# Patient Record
Sex: FEMALE | NOT HISPANIC OR LATINO | Employment: STUDENT | URBAN - METROPOLITAN AREA
[De-identification: names, ages, dates, MRNs, and addresses within clinical notes are randomized per-mention and may not be internally consistent; named-entity substitution may affect disease eponyms.]

---

## 2023-02-19 ENCOUNTER — HOSPITAL ENCOUNTER (EMERGENCY)
Facility: CLINIC | Age: 23
Discharge: HOME | End: 2023-02-20
Attending: EMERGENCY MEDICINE | Admitting: EMERGENCY MEDICINE
Payer: COMMERCIAL

## 2023-02-19 ENCOUNTER — APPOINTMENT (OUTPATIENT)
Dept: CT IMAGING | Facility: CLINIC | Age: 23
End: 2023-02-19
Attending: EMERGENCY MEDICINE
Payer: COMMERCIAL

## 2023-02-19 ENCOUNTER — OFFICE VISIT (OUTPATIENT)
Dept: URGENT CARE | Facility: URGENT CARE | Age: 23
End: 2023-02-19
Payer: COMMERCIAL

## 2023-02-19 VITALS
HEIGHT: 65 IN | OXYGEN SATURATION: 100 % | TEMPERATURE: 97.8 F | SYSTOLIC BLOOD PRESSURE: 116 MMHG | DIASTOLIC BLOOD PRESSURE: 67 MMHG | BODY MASS INDEX: 20.83 KG/M2 | WEIGHT: 125 LBS | HEART RATE: 86 BPM

## 2023-02-19 DIAGNOSIS — R10.31 RLQ ABDOMINAL PAIN: Primary | ICD-10-CM

## 2023-02-19 DIAGNOSIS — R10.33 PERIUMBILICAL ABDOMINAL PAIN: ICD-10-CM

## 2023-02-19 DIAGNOSIS — R10.31 ABDOMINAL PAIN, RIGHT LOWER QUADRANT: ICD-10-CM

## 2023-02-19 LAB
ALBUMIN SERPL BCG-MCNC: 4.7 G/DL (ref 3.5–5.2)
ALBUMIN UR-MCNC: NEGATIVE MG/DL
ALP SERPL-CCNC: 82 U/L (ref 35–104)
ALT SERPL W P-5'-P-CCNC: 13 U/L (ref 10–35)
ANION GAP SERPL CALCULATED.3IONS-SCNC: 14 MMOL/L (ref 7–15)
APPEARANCE UR: CLEAR
AST SERPL W P-5'-P-CCNC: 20 U/L (ref 10–35)
BASOPHILS # BLD AUTO: 0.1 10E3/UL (ref 0–0.2)
BASOPHILS NFR BLD AUTO: 1 %
BILIRUB SERPL-MCNC: 0.3 MG/DL
BILIRUB UR QL STRIP: NEGATIVE
BUN SERPL-MCNC: 12 MG/DL (ref 6–20)
CALCIUM SERPL-MCNC: 9.4 MG/DL (ref 8.6–10)
CHLORIDE SERPL-SCNC: 102 MMOL/L (ref 98–107)
CLUE CELLS: ABNORMAL
COLOR UR AUTO: YELLOW
CREAT SERPL-MCNC: 0.85 MG/DL (ref 0.51–0.95)
DEPRECATED HCO3 PLAS-SCNC: 22 MMOL/L (ref 22–29)
EOSINOPHIL # BLD AUTO: 0 10E3/UL (ref 0–0.7)
EOSINOPHIL NFR BLD AUTO: 1 %
ERYTHROCYTE [DISTWIDTH] IN BLOOD BY AUTOMATED COUNT: 12.4 % (ref 10–15)
GFR SERPL CREATININE-BSD FRML MDRD: >90 ML/MIN/1.73M2
GLUCOSE SERPL-MCNC: 102 MG/DL (ref 70–99)
GLUCOSE UR STRIP-MCNC: NEGATIVE MG/DL
HCG SERPL QL: NEGATIVE
HCG UR QL: NEGATIVE
HCT VFR BLD AUTO: 41.4 % (ref 35–47)
HGB BLD-MCNC: 14 G/DL (ref 11.7–15.7)
HGB UR QL STRIP: NEGATIVE
IMM GRANULOCYTES # BLD: 0 10E3/UL
IMM GRANULOCYTES NFR BLD: 0 %
KETONES UR STRIP-MCNC: ABNORMAL MG/DL
LEUKOCYTE ESTERASE UR QL STRIP: NEGATIVE
LYMPHOCYTES # BLD AUTO: 3 10E3/UL (ref 0.8–5.3)
LYMPHOCYTES NFR BLD AUTO: 41 %
MCH RBC QN AUTO: 31.7 PG (ref 26.5–33)
MCHC RBC AUTO-ENTMCNC: 33.8 G/DL (ref 31.5–36.5)
MCV RBC AUTO: 94 FL (ref 78–100)
MONOCYTES # BLD AUTO: 0.6 10E3/UL (ref 0–1.3)
MONOCYTES NFR BLD AUTO: 8 %
NEUTROPHILS # BLD AUTO: 3.6 10E3/UL (ref 1.6–8.3)
NEUTROPHILS NFR BLD AUTO: 49 %
NITRATE UR QL: NEGATIVE
NRBC # BLD AUTO: 0 10E3/UL
NRBC BLD AUTO-RTO: 0 /100
PH UR STRIP: 5.5 [PH] (ref 5–7)
PLATELET # BLD AUTO: 274 10E3/UL (ref 150–450)
POTASSIUM SERPL-SCNC: 3.8 MMOL/L (ref 3.4–5.3)
PROT SERPL-MCNC: 7.5 G/DL (ref 6.4–8.3)
RBC # BLD AUTO: 4.42 10E6/UL (ref 3.8–5.2)
SODIUM SERPL-SCNC: 138 MMOL/L (ref 136–145)
SP GR UR STRIP: 1.02 (ref 1–1.03)
TRICHOMONAS, WET PREP: ABNORMAL
UROBILINOGEN UR STRIP-ACNC: 0.2 E.U./DL
WBC # BLD AUTO: 7.3 10E3/UL (ref 4–11)
WBC'S/HIGH POWER FIELD, WET PREP: ABNORMAL
YEAST, WET PREP: ABNORMAL

## 2023-02-19 PROCEDURE — 85025 COMPLETE CBC W/AUTO DIFF WBC: CPT | Performed by: EMERGENCY MEDICINE

## 2023-02-19 PROCEDURE — 99285 EMERGENCY DEPT VISIT HI MDM: CPT | Mod: 25 | Performed by: EMERGENCY MEDICINE

## 2023-02-19 PROCEDURE — 87210 SMEAR WET MOUNT SALINE/INK: CPT

## 2023-02-19 PROCEDURE — 99204 OFFICE O/P NEW MOD 45 MIN: CPT | Performed by: INTERNAL MEDICINE

## 2023-02-19 PROCEDURE — 250N000011 HC RX IP 250 OP 636: Performed by: EMERGENCY MEDICINE

## 2023-02-19 PROCEDURE — 250N000009 HC RX 250: Performed by: EMERGENCY MEDICINE

## 2023-02-19 PROCEDURE — 84703 CHORIONIC GONADOTROPIN ASSAY: CPT | Performed by: EMERGENCY MEDICINE

## 2023-02-19 PROCEDURE — 81003 URINALYSIS AUTO W/O SCOPE: CPT

## 2023-02-19 PROCEDURE — 81025 URINE PREGNANCY TEST: CPT | Performed by: EMERGENCY MEDICINE

## 2023-02-19 PROCEDURE — 36415 COLL VENOUS BLD VENIPUNCTURE: CPT | Performed by: EMERGENCY MEDICINE

## 2023-02-19 PROCEDURE — 99284 EMERGENCY DEPT VISIT MOD MDM: CPT | Performed by: EMERGENCY MEDICINE

## 2023-02-19 PROCEDURE — 80053 COMPREHEN METABOLIC PANEL: CPT | Performed by: EMERGENCY MEDICINE

## 2023-02-19 PROCEDURE — 74177 CT ABD & PELVIS W/CONTRAST: CPT

## 2023-02-19 RX ORDER — IOPAMIDOL 755 MG/ML
100 INJECTION, SOLUTION INTRAVASCULAR ONCE
Status: COMPLETED | OUTPATIENT
Start: 2023-02-19 | End: 2023-02-19

## 2023-02-19 RX ORDER — ONDANSETRON 4 MG/1
4 TABLET, ORALLY DISINTEGRATING ORAL EVERY 8 HOURS PRN
Qty: 10 TABLET | Refills: 0 | Status: SHIPPED | OUTPATIENT
Start: 2023-02-19

## 2023-02-19 RX ORDER — HYDROXYCHLOROQUINE SULFATE 200 MG/1
TABLET, FILM COATED ORAL
COMMUNITY

## 2023-02-19 RX ORDER — MEDROXYPROGESTERONE ACETATE 150 MG/ML
INJECTION, SUSPENSION INTRAMUSCULAR
COMMUNITY

## 2023-02-19 RX ADMIN — IOPAMIDOL 66 ML: 755 INJECTION, SOLUTION INTRAVENOUS at 22:17

## 2023-02-19 RX ADMIN — SODIUM CHLORIDE 56 ML: 9 INJECTION, SOLUTION INTRAVENOUS at 22:22

## 2023-02-19 ASSESSMENT — ENCOUNTER SYMPTOMS
PALPITATIONS: 0
SORE THROAT: 0
HEADACHES: 0
DIAPHORESIS: 0
ADENOPATHY: 0
MYALGIAS: 0
HEMATURIA: 0
HEARTBURN: 0
FEVER: 0
APPETITE CHANGE: 1
COUGH: 0
RHINORRHEA: 0
DIARRHEA: 0
SHORTNESS OF BREATH: 0
CONSTIPATION: 0
DYSURIA: 0
HEMATOCHEZIA: 0
VOMITING: 0
CHILLS: 0
ACTIVITY CHANGE: 0

## 2023-02-19 ASSESSMENT — ACTIVITIES OF DAILY LIVING (ADL)
ADLS_ACUITY_SCORE: 35
ADLS_ACUITY_SCORE: 33

## 2023-02-20 ENCOUNTER — APPOINTMENT (OUTPATIENT)
Dept: ULTRASOUND IMAGING | Facility: CLINIC | Age: 23
End: 2023-02-20
Attending: EMERGENCY MEDICINE
Payer: COMMERCIAL

## 2023-02-20 VITALS
WEIGHT: 134.5 LBS | SYSTOLIC BLOOD PRESSURE: 120 MMHG | BODY MASS INDEX: 22.41 KG/M2 | TEMPERATURE: 98.8 F | HEIGHT: 65 IN | HEART RATE: 80 BPM | DIASTOLIC BLOOD PRESSURE: 81 MMHG | OXYGEN SATURATION: 98 % | RESPIRATION RATE: 16 BRPM

## 2023-02-20 PROCEDURE — 76856 US EXAM PELVIC COMPLETE: CPT

## 2023-02-20 NOTE — ED NOTES
"     Emergency Department Patient Sign-out       Brief HPI:  This is a 22 year old female signed out to me by Dr. Persaud .  See initial ED Provider note for details of the presentation.            Significant Events prior to my assuming care: Pt with lower umbilical abd pain. Labs, CT ok. US pending. Likely home.      Exam:   Patient Vitals for the past 24 hrs:   BP Temp Temp src Pulse Resp SpO2 Height Weight   02/19/23 2015 130/83 98.8  F (37.1  C) Oral 92 18 97 % 1.651 m (5' 5\") 61 kg (134 lb 8 oz)           ED RESULTS:   Results for orders placed or performed during the hospital encounter of 02/19/23 (from the past 24 hour(s))   HCG qualitative urine     Status: Normal    Collection Time: 02/19/23  8:33 PM   Result Value Ref Range    hCG Urine Qualitative Negative Negative   CBC with platelets differential     Status: None    Collection Time: 02/19/23  8:50 PM    Narrative    The following orders were created for panel order CBC with platelets differential.  Procedure                               Abnormality         Status                     ---------                               -----------         ------                     CBC with platelets and d...[[id number]]                      Final result                 Please view results for these tests on the individual orders.   Comprehensive metabolic panel     Status: Abnormal    Collection Time: 02/19/23  8:50 PM   Result Value Ref Range    Sodium 138 136 - 145 mmol/L    Potassium 3.8 3.4 - 5.3 mmol/L    Chloride 102 98 - 107 mmol/L    Carbon Dioxide (CO2) 22 22 - 29 mmol/L    Anion Gap 14 7 - 15 mmol/L    Urea Nitrogen 12.0 6.0 - 20.0 mg/dL    Creatinine 0.85 0.51 - 0.95 mg/dL    Calcium 9.4 8.6 - 10.0 mg/dL    Glucose 102 (H) 70 - 99 mg/dL    Alkaline Phosphatase 82 35 - 104 U/L    AST 20 10 - 35 U/L    ALT 13 10 - 35 U/L    Protein Total 7.5 6.4 - 8.3 g/dL    Albumin 4.7 3.5 - 5.2 g/dL    Bilirubin Total 0.3 <=1.2 mg/dL    GFR Estimate >90 >60 mL/min/1.73m2 "   HCG qualitative Blood     Status: Normal    Collection Time: 02/19/23  8:50 PM   Result Value Ref Range    hCG Serum Qualitative Negative Negative   CBC with platelets and differential     Status: None    Collection Time: 02/19/23  8:50 PM   Result Value Ref Range    WBC Count 7.3 4.0 - 11.0 10e3/uL    RBC Count 4.42 3.80 - 5.20 10e6/uL    Hemoglobin 14.0 11.7 - 15.7 g/dL    Hematocrit 41.4 35.0 - 47.0 %    MCV 94 78 - 100 fL    MCH 31.7 26.5 - 33.0 pg    MCHC 33.8 31.5 - 36.5 g/dL    RDW 12.4 10.0 - 15.0 %    Platelet Count 274 150 - 450 10e3/uL    % Neutrophils 49 %    % Lymphocytes 41 %    % Monocytes 8 %    % Eosinophils 1 %    % Basophils 1 %    % Immature Granulocytes 0 %    NRBCs per 100 WBC 0 <1 /100    Absolute Neutrophils 3.6 1.6 - 8.3 10e3/uL    Absolute Lymphocytes 3.0 0.8 - 5.3 10e3/uL    Absolute Monocytes 0.6 0.0 - 1.3 10e3/uL    Absolute Eosinophils 0.0 0.0 - 0.7 10e3/uL    Absolute Basophils 0.1 0.0 - 0.2 10e3/uL    Absolute Immature Granulocytes 0.0 <=0.4 10e3/uL    Absolute NRBCs 0.0 10e3/uL   CT Abdomen Pelvis w Contrast     Status: None    Collection Time: 02/19/23 10:11 PM    Narrative    EXAM: CT ABDOMEN AND PELVIS WITH CONTRAST  LOCATION: [facility]  DATE/TIME: 2/19/2023 10:11 PM    INDICATION: Right lower quadrant pain.  COMPARISON: None.    TECHNIQUE: CT scan of the abdomen and pelvis was performed following injection of IV contrast. Multiplanar reformats were obtained. Dose reduction techniques were used.  CONTRAST: 68 mL Isovue 370.    FINDINGS:    LOWER CHEST: Unremarkable.    HEPATOBILIARY: Unremarkable.    SPLEEN: Unremarkable.    PANCREAS: Unremarkable.    ADRENAL GLANDS: Unremarkable.    KIDNEYS/BLADDER: No suspicious renal lesions.    BOWEL: No dilatation of the small or large bowel. The appendix is likely visualized and unremarkable. No visualized bowel wall thickening, pneumatosis or free intraperitoneal gas.    LYMPH NODES:  Unremarkable.    PELVIC ORGANS: No acute findings.    MUSCULOSKELETAL: No acute findings.    OTHER: A trace amount of free fluid in the pelvis.      Impression    IMPRESSION:   1.  A trace amount of nonspecific free fluid in the pelvis.  2.  No other cause of acute pain identified in the abdomen or pelvis. The appendix is likely visualized and unremarkable.        ED MEDICATIONS:   Medications   0.9% sodium chloride BOLUS (has no administration in time range)   iopamidol (ISOVUE-370) solution 100 mL (66 mLs Intravenous Given 2/19/23 2217)   sodium chloride 0.9 % bag 500mL for CT scan flush use (56 mLs As instructed Given 2/19/23 2222)         Impression:    ICD-10-CM    1. Abdominal pain, right lower quadrant  R10.31           Plan:   Pelvic US normal. Patient reassured. She will f/u with her PCP or return if worsening.        MD Anthony Torres Matthew Joseph, MD  02/20/23 0033

## 2023-02-20 NOTE — PROGRESS NOTES
ASSESSMENT AND PLAN:      ICD-10-CM    1. Abdominal pain  R10.9 UA macro with reflex to Microscopic and Culture - Clinc Collect     Wet prep - Clinic Collect      2. Periumbilical abdominal pain  R10.33       3. RLQ abdominal pain  R10.31           Differential Diagnosis:  Abdominal Pain: Constipation, Appendix, UTI, Pyelonephritis, Ovarian Cyst and Non Specific  Serious Comorbid Conditions: none      PLAN:  ER referral    Patient Instructions       With location of pain, recommend ER evaluation for possible appendicitis          Urine test and vaginal swab look normal  No signs of kidney or bladder infection/stones      Component      Latest Ref Rng & Units 2/19/2023   Color Urine      Colorless, Straw, Light Yellow, Yellow Yellow   Appearance Urine      Clear Clear   Glucose Urine      Negative mg/dL Negative   Bilirubin Urine      Negative Negative   Ketones Urine      Negative mg/dL Trace (A)   Specific Gravity Urine      1.003 - 1.035 1.025   Blood Urine      Negative Negative   pH Urine      5.0 - 7.0 5.5   Protein Albumin Urine      Negative mg/dL Negative   Urobilinogen Urine      0.2, 1.0 E.U./dL 0.2   Nitrite Urine      Negative Negative   Leukocyte Esterase Urine      Negative Negative   Trichomonas      Absent Absent   Yeast      Absent Absent   Clue cells      Absent Absent   WBCs/high power field      None 1+ (A)     Return for go To ER.        Stephanie Luque MD  [facility]    Subjective     Viri Pope is a 22 year old who presents for Patient presents with:  Urgent Care  Abdominal Pain: Pt in clinic to have eval for abdominal pain.    a new patient of [facility].    Abdominal Pain    Location: periumbilical & to the lower R side  Radiation: None.    Pain character: initially felt like bruise then cramping,   Severity: 3 on a scale of 1-10.  8/10 if touches area  Duration: 2 day(s)   Course of Illness: constant.  Exacerbated by: pressure directly on area, coughing &  "bending forward  Relieved by: nothing tried  Associated Symptoms: anorexia.  Female : no menses - on depo shot  Surgical History: none    No exercising    Review of Systems   Constitutional: Positive for appetite change (lower). Negative for activity change, chills, diaphoresis and fever.   HENT: Negative for rhinorrhea and sore throat.    Respiratory: Negative for cough and shortness of breath.    Cardiovascular: Negative for chest pain and palpitations.   Gastrointestinal: Negative for constipation, diarrhea, heartburn, hematochezia and vomiting.        Normal bm  Usually 1-2 day.  Not gassy or bloated   Genitourinary: Negative for dysuria, hematuria and vaginal discharge.   Musculoskeletal: Negative for myalgias.   Skin: Negative for rash.   Neurological: Negative for headaches.   Hematological: Negative for adenopathy.           Objective    /67   Pulse 86   Temp 97.8  F (36.6  C) (Temporal)   Ht 1.651 m (5' 5\")   Wt 56.7 kg (125 lb)   SpO2 100%   BMI 20.80 kg/m    Physical Exam  Vitals reviewed.   Constitutional:       Appearance: Normal appearance.   HENT:      Mouth/Throat:      Mouth: Mucous membranes are moist.      Pharynx: Oropharynx is clear.   Eyes:      General: No scleral icterus.  Cardiovascular:      Rate and Rhythm: Normal rate and regular rhythm.      Pulses: Normal pulses.      Heart sounds: Normal heart sounds.   Pulmonary:      Effort: Pulmonary effort is normal.      Breath sounds: Normal breath sounds.   Abdominal:      Palpations: Abdomen is soft.      Tenderness: There is abdominal tenderness (RLQ & umbilical pain). There is no right CVA tenderness, left CVA tenderness, guarding or rebound.      Comments: Hypoactive bowel sounds     Lymphadenopathy:      Cervical: No cervical adenopathy.   Neurological:      Mental Status: She is alert.            Results for orders placed or performed in visit on 02/19/23 (from the past 24 hour(s))   UA macro with reflex to Microscopic and " Culture - Clin Collect    Specimen: Urine, Clean Catch   Result Value Ref Range    Color Urine Yellow Colorless, Straw, Light Yellow, Yellow    Appearance Urine Clear Clear    Glucose Urine Negative Negative mg/dL    Bilirubin Urine Negative Negative    Ketones Urine Trace (A) Negative mg/dL    Specific Gravity Urine 1.025 1.003 - 1.035    Blood Urine Negative Negative    pH Urine 5.5 5.0 - 7.0    Protein Albumin Urine Negative Negative mg/dL    Urobilinogen Urine 0.2 0.2, 1.0 E.U./dL    Nitrite Urine Negative Negative    Leukocyte Esterase Urine Negative Negative    Narrative    Microscopic not indicated   Wet prep - Clinic Collect    Specimen: Vagina; Swab   Result Value Ref Range    Trichomonas Absent Absent    Yeast Absent Absent    Clue Cells Absent Absent    WBCs/high power field 1+ (A) None

## 2023-02-20 NOTE — PATIENT INSTRUCTIONS
With location of pain, recommend ER evaluation for possible appendicitis          Urine test and vaginal swab look normal  No signs of kidney or bladder infection/stones      Component      Latest Ref Rng & Units 2/19/2023   Color Urine      Colorless, Straw, Light Yellow, Yellow Yellow   Appearance Urine      Clear Clear   Glucose Urine      Negative mg/dL Negative   Bilirubin Urine      Negative Negative   Ketones Urine      Negative mg/dL Trace (A)   Specific Gravity Urine      1.003 - 1.035 1.025   Blood Urine      Negative Negative   pH Urine      5.0 - 7.0 5.5   Protein Albumin Urine      Negative mg/dL Negative   Urobilinogen Urine      0.2, 1.0 E.U./dL 0.2   Nitrite Urine      Negative Negative   Leukocyte Esterase Urine      Negative Negative   Trichomonas      Absent Absent   Yeast      Absent Absent   Clue cells      Absent Absent   WBCs/high power field      None 1+ (A)

## 2023-02-20 NOTE — ED NOTES
Writer attempted iv insertion x2. Blood collected during the first attempt and vein blew- during second attempt patient became upset and stated that it hurt too bad and asked writer to remove despite vein access being established. Writer requested charge nurse attempt access with ultrasound.

## 2023-02-20 NOTE — ED PROVIDER NOTES
ED Provider Note  [facility]      History     Chief Complaint   Patient presents with     Abdominal Pain     HPI  Viri Pope is a 22 year old female who is otherwise healthy arriving today to the emergency department valuation of abdominal pain.  This patient reports mild decrease in appetite and periumbilical discomfort rated at mild in intensity several days ago.  Yesterday she woke with increasing abdominal pain approximately 1 inch right inferior lateral from the umbilicus.  She reports no similar symptoms in the past.  This is mild in intensity at this time but reports some discomfort with moving, pressure from her pants.  She reports no nausea or vomiting.  No diarrhea, melena, hematochezia.  She reports no vaginal bleeding or discharge.  She did present to urgent care earlier today where urinalysis demonstrate no sign of infection.  Wet prep performed demonstrated no significant abnormality.  She was referred to the emergency department for possible appendicitis.  Patient reports no history of prior intra-abdominal surgeries.  She does not want any for pain at this time.  Of note the patient did eat in route between urgent care and ER.    Past Medical History  History reviewed. No pertinent past medical history.  History reviewed. No pertinent surgical history.  hydroxychloroquine (PLAQUENIL) 200 MG tablet  medroxyPROGESTERone (DEPO-PROVERA) 150 MG/ML IM injection  ondansetron (ZOFRAN ODT) 4 MG ODT tab  IBUPROFEN  UNABLE TO FIND      No Known Allergies  Family History  History reviewed. No pertinent family history.  Social History   Social History     Tobacco Use     Smoking status: Never     Smokeless tobacco: Never   Substance Use Topics     Alcohol use: Yes     Comment: occ      Past medical history, past surgical history, medications, allergies, family history, and social history were reviewed with the patient. No additional pertinent items.      A complete review of  "systems was performed with pertinent positives and negatives noted in the HPI, and all other systems negative.    Physical Exam   BP: 130/83  Pulse: 92  Temp: 98.8  F (37.1  C)  Resp: 18  Height: 165.1 cm (5' 5\")  Weight: 61 kg (134 lb 8 oz)  SpO2: 97 %  Physical Exam  Vitals and nursing note reviewed.   Constitutional:       General: She is not in acute distress.     Appearance: Normal appearance. She is not ill-appearing, toxic-appearing or diaphoretic.   HENT:      Head: Normocephalic and atraumatic.      Right Ear: External ear normal.      Left Ear: External ear normal.   Eyes:      Extraocular Movements: Extraocular movements intact.      Conjunctiva/sclera: Conjunctivae normal.      Pupils: Pupils are equal, round, and reactive to light.   Cardiovascular:      Rate and Rhythm: Normal rate.      Pulses: Normal pulses.      Heart sounds: Normal heart sounds. No murmur heard.    No friction rub.   Pulmonary:      Effort: Pulmonary effort is normal. No respiratory distress.      Breath sounds: No stridor. No wheezing, rhonchi or rales.   Abdominal:      General: Abdomen is flat. There is no distension.      Tenderness: There is abdominal tenderness in the right lower quadrant. There is no guarding or rebound.   Musculoskeletal:      Cervical back: Normal range of motion.   Skin:     General: Skin is warm.      Capillary Refill: Capillary refill takes less than 2 seconds.      Coloration: Skin is not pale.      Findings: No bruising or erythema.   Neurological:      General: No focal deficit present.      Mental Status: She is alert and oriented to person, place, and time.   Psychiatric:         Mood and Affect: Mood normal.         Behavior: Behavior normal.         ED Course, Procedures, & Data      Procedures                Results for orders placed or performed during the hospital encounter of 02/19/23   CT Abdomen Pelvis w Contrast     Status: None    Narrative    EXAM: CT ABDOMEN AND PELVIS WITH " CONTRAST  LOCATION: [facility]  DATE/TIME: 2/19/2023 10:11 PM    INDICATION: Right lower quadrant pain.  COMPARISON: None.    TECHNIQUE: CT scan of the abdomen and pelvis was performed following injection of IV contrast. Multiplanar reformats were obtained. Dose reduction techniques were used.  CONTRAST: 68 mL Isovue 370.    FINDINGS:    LOWER CHEST: Unremarkable.    HEPATOBILIARY: Unremarkable.    SPLEEN: Unremarkable.    PANCREAS: Unremarkable.    ADRENAL GLANDS: Unremarkable.    KIDNEYS/BLADDER: No suspicious renal lesions.    BOWEL: No dilatation of the small or large bowel. The appendix is likely visualized and unremarkable. No visualized bowel wall thickening, pneumatosis or free intraperitoneal gas.    LYMPH NODES: Unremarkable.    PELVIC ORGANS: No acute findings.    MUSCULOSKELETAL: No acute findings.    OTHER: A trace amount of free fluid in the pelvis.      Impression    IMPRESSION:   1.  A trace amount of nonspecific free fluid in the pelvis.  2.  No other cause of acute pain identified in the abdomen or pelvis. The appendix is likely visualized and unremarkable.    Comprehensive metabolic panel     Status: Abnormal   Result Value Ref Range    Sodium 138 136 - 145 mmol/L    Potassium 3.8 3.4 - 5.3 mmol/L    Chloride 102 98 - 107 mmol/L    Carbon Dioxide (CO2) 22 22 - 29 mmol/L    Anion Gap 14 7 - 15 mmol/L    Urea Nitrogen 12.0 6.0 - 20.0 mg/dL    Creatinine 0.85 0.51 - 0.95 mg/dL    Calcium 9.4 8.6 - 10.0 mg/dL    Glucose 102 (H) 70 - 99 mg/dL    Alkaline Phosphatase 82 35 - 104 U/L    AST 20 10 - 35 U/L    ALT 13 10 - 35 U/L    Protein Total 7.5 6.4 - 8.3 g/dL    Albumin 4.7 3.5 - 5.2 g/dL    Bilirubin Total 0.3 <=1.2 mg/dL    GFR Estimate >90 >60 mL/min/1.73m2   HCG qualitative Blood     Status: Normal   Result Value Ref Range    hCG Serum Qualitative Negative Negative   HCG qualitative urine     Status: Normal   Result Value Ref Range    hCG Urine  Qualitative Negative Negative   CBC with platelets and differential     Status: None   Result Value Ref Range    WBC Count 7.3 4.0 - 11.0 10e3/uL    RBC Count 4.42 3.80 - 5.20 10e6/uL    Hemoglobin 14.0 11.7 - 15.7 g/dL    Hematocrit 41.4 35.0 - 47.0 %    MCV 94 78 - 100 fL    MCH 31.7 26.5 - 33.0 pg    MCHC 33.8 31.5 - 36.5 g/dL    RDW 12.4 10.0 - 15.0 %    Platelet Count 274 150 - 450 10e3/uL    % Neutrophils 49 %    % Lymphocytes 41 %    % Monocytes 8 %    % Eosinophils 1 %    % Basophils 1 %    % Immature Granulocytes 0 %    NRBCs per 100 WBC 0 <1 /100    Absolute Neutrophils 3.6 1.6 - 8.3 10e3/uL    Absolute Lymphocytes 3.0 0.8 - 5.3 10e3/uL    Absolute Monocytes 0.6 0.0 - 1.3 10e3/uL    Absolute Eosinophils 0.0 0.0 - 0.7 10e3/uL    Absolute Basophils 0.1 0.0 - 0.2 10e3/uL    Absolute Immature Granulocytes 0.0 <=0.4 10e3/uL    Absolute NRBCs 0.0 10e3/uL   CBC with platelets differential     Status: None    Narrative    The following orders were created for panel order CBC with platelets differential.  Procedure                               Abnormality         Status                     ---------                               -----------         ------                     CBC with platelets and d...[[id number]]                      Final result                 Please view results for these tests on the individual orders.   Results for orders placed or performed in visit on 02/19/23   UA macro with reflex to Microscopic and Culture - Clinc Collect     Status: Abnormal    Specimen: Urine, Clean Catch   Result Value Ref Range    Color Urine Yellow Colorless, Straw, Light Yellow, Yellow    Appearance Urine Clear Clear    Glucose Urine Negative Negative mg/dL    Bilirubin Urine Negative Negative    Ketones Urine Trace (A) Negative mg/dL    Specific Gravity Urine 1.025 1.003 - 1.035    Blood Urine Negative Negative    pH Urine 5.5 5.0 - 7.0    Protein Albumin Urine Negative Negative mg/dL    Urobilinogen Urine 0.2  0.2, 1.0 E.U./dL    Nitrite Urine Negative Negative    Leukocyte Esterase Urine Negative Negative    Narrative    Microscopic not indicated   Wet prep - Clinic Collect     Status: Abnormal    Specimen: Vagina; Swab   Result Value Ref Range    Trichomonas Absent Absent    Yeast Absent Absent    Clue Cells Absent Absent    WBCs/high power field 1+ (A) None     Medications   0.9% sodium chloride BOLUS (has no administration in time range)   iopamidol (ISOVUE-370) solution 100 mL (66 mLs Intravenous Given 2/19/23 2217)   sodium chloride 0.9 % bag 500mL for CT scan flush use (56 mLs As instructed Given 2/19/23 2222)     Labs Ordered and Resulted from Time of ED Arrival to Time of ED Departure   COMPREHENSIVE METABOLIC PANEL - Abnormal       Result Value    Sodium 138      Potassium 3.8      Chloride 102      Carbon Dioxide (CO2) 22      Anion Gap 14      Urea Nitrogen 12.0      Creatinine 0.85      Calcium 9.4      Glucose 102 (*)     Alkaline Phosphatase 82      AST 20      ALT 13      Protein Total 7.5      Albumin 4.7      Bilirubin Total 0.3      GFR Estimate >90     HCG QUALITATIVE PREGNANCY - Normal    hCG Serum Qualitative Negative     HCG QUALITATIVE URINE - Normal    hCG Urine Qualitative Negative     CBC WITH PLATELETS AND DIFFERENTIAL    WBC Count 7.3      RBC Count 4.42      Hemoglobin 14.0      Hematocrit 41.4      MCV 94      MCH 31.7      MCHC 33.8      RDW 12.4      Platelet Count 274      % Neutrophils 49      % Lymphocytes 41      % Monocytes 8      % Eosinophils 1      % Basophils 1      % Immature Granulocytes 0      NRBCs per 100 WBC 0      Absolute Neutrophils 3.6      Absolute Lymphocytes 3.0      Absolute Monocytes 0.6      Absolute Eosinophils 0.0      Absolute Basophils 0.1      Absolute Immature Granulocytes 0.0      Absolute NRBCs 0.0       CT Abdomen Pelvis w Contrast   Final Result   IMPRESSION:    1.  A trace amount of nonspecific free fluid in the pelvis.   2.  No other cause of acute pain  identified in the abdomen or pelvis. The appendix is likely visualized and unremarkable.       US Pelvis Cmpl wo Transvaginal w Abd/Pel Duplex Lmt    (Results Pending)          Medical Decision Making  The patient's presentation is strongly suggestive of an acute complicated injury.    The patient's evaluation involved:  review of external note(s) from 2 sources (see separate area of note for details)  ordering and/or review of 2 test(s) in this encounter (see separate area of note for details)  review of 2 test result(s) ordered prior to this encounter (see separate area of note for details)    The patient's management involved a decision regarding hospitalization.      Assessment & Plan      Viri Pope is a 22 year old female who is otherwise healthy arriving today to the emergency department valuation of abdominal pain.  On arrival patient noted be alert.  She presently afebrile and hemodynamically stable.  She is currently seated upright in a chair.  She be slightly uncomfortable but is nontoxic.  External evaluation the abdomen demonstrate no sign of rash or trauma.  No evidence of distention.  No palpable umbilical hernia.  Some pain with palpation of the right lower quadrant.  This is closer to the umbilicus and is classic for appendicitis but certainly possible.  I did discuss with her broad differential including pyelonephritis, nephrolithiasis is less likely based on outside evaluation and urinalysis.  Other possibility including torsion, ruptured cyst, ectopic pregnancy again lower suspicion.  With some localization to the right lower quadrant I do believe she requires laboratory studies and imaging.    Laboratory studies demonstrate negative pregnancy test.  No significant leukocytosis or anemia.  CT imaging demonstrating no obvious periappendiceal inflammation and what appears to be normal appendix.  I did discuss other potential gynecologic etiologies as well as risk and benefits of  ultrasonography.  I had a long discussion with the parent of the patient and patient as well regarding overall reassuring data including normal vital signs, laboratory studies, imaging, and urinalysis.  She has had a wet prep which was negative.  I believe she may safely return to home pending negative ultrasonography with close clinical monitoring.  I would have her follow-up with her primary care physician in the next 2 to 3 days for reevaluation.  Should she have any change, progression or worsening of symptoms we will have her return emergently for reevaluation.    This patient was signed over to my colleague at midnight pending review of US.      I have reviewed the nursing notes. I have reviewed the findings, diagnosis, plan and need for follow up with the patient.    New Prescriptions    ONDANSETRON (ZOFRAN ODT) 4 MG ODT TAB    Take 1 tablet (4 mg) by mouth every 8 hours as needed for nausea       Final diagnoses:   Abdominal pain, right lower quadrant       Eliazar Persaud  [facility] EMERGENCY DEPARTMENT  2/19/2023     Eliazar Persaud MD  02/19/23 [id number]

## 2023-02-20 NOTE — ED TRIAGE NOTES
Patient c/o burning pain to lower abdomen. Patient said it started on Friday evening. She was in Urgent Care earlier. She was tild to go to ER for possible ovarian cyst or appendicitis.      Triage Assessment     Row Name 02/19/23 2017       Triage Assessment (Adult)    Airway WDL WDL       Respiratory WDL    Respiratory WDL WDL       Skin Circulation/Temperature WDL    Skin Circulation/Temperature WDL WDL       Cardiac WDL    Cardiac WDL WDL       Peripheral/Neurovascular WDL    Peripheral Neurovascular WDL WDL       Cognitive/Neuro/Behavioral WDL    Cognitive/Neuro/Behavioral WDL WDL

## 2023-02-20 NOTE — DISCHARGE INSTRUCTIONS
I am happy to see that this thus far your work-up is demonstrated no sign of dangerous or life-threatening cause to explain your symptoms.  In the emergency department we did obtain laboratory studies that demonstrate no elevation in your white blood cells, the cells typically fight infection.  This was followed by CT imaging.  This demonstrates no sign of definitive appendicitis, obstruction of the intestines, kidney stones, or issues with the ovaries or uterus.  Overall fairly reassuring.  While CT scans are not 100% for ruling out dangerous or life-threatening causes they are fairly close.  We did follow this with ultrasonography as well demonstrating good/adequate blood flow to the ovaries.  Cause remains unclear certainly as we discussed ruptured ovarian cyst are possible as well.  I would plan to treat symptoms with antinausea medication or over-the-counter pain medication as needed.  I would anticipate gradual resolution of symptoms over the next several days.  Should you notice change, progression or recurrence we would want you to return emergently.  These do include recurrent vomiting, increasing or generalized abdominal pain, vaginal bleeding or discharge, fever, shaking chills or any other concern.

## 2023-10-01 ENCOUNTER — HEALTH MAINTENANCE LETTER (OUTPATIENT)
Age: 23
End: 2023-10-01

## 2024-11-24 ENCOUNTER — HEALTH MAINTENANCE LETTER (OUTPATIENT)
Age: 24
End: 2024-11-24